# Patient Record
Sex: MALE | Race: WHITE
[De-identification: names, ages, dates, MRNs, and addresses within clinical notes are randomized per-mention and may not be internally consistent; named-entity substitution may affect disease eponyms.]

---

## 2018-08-09 ENCOUNTER — HOSPITAL ENCOUNTER (EMERGENCY)
Dept: HOSPITAL 97 - ER | Age: 48
Discharge: HOME | End: 2018-08-09
Payer: COMMERCIAL

## 2018-08-09 DIAGNOSIS — R04.0: Primary | ICD-10-CM

## 2018-08-09 PROCEDURE — 99281 EMR DPT VST MAYX REQ PHY/QHP: CPT

## 2018-08-09 NOTE — ER
Nurse's Notes                                                                                     

 [facility]                                                                

Name: Bill Rosa                                                                          

Age: 48 yrs                                                                                       

Sex: Male                                                                                         

: 1970                                                                                   

MRN: [id number]                                                                                   

Arrival Date: 2018                                                                          

Time: 07:32                                                                                       

Account#: [id number]                                                                            

Bed 20                                                                                            

Private MD:                                                                                       

Diagnosis: Epistaxis                                                                              

                                                                                                  

Presentation:                                                                                     

                                                                                             

07:37 Presenting complaint: Patient states: I have been having intermittent nose bleeding     la1 

      since Monday, no bleeding so far today. Transition of care: patient was not received        

      from another setting of care. Onset of symptoms was 2018. Risk Assessment:       

      Do you want to hurt yourself or someone else? Patient reports no desire to harm self or     

      others. Initial Sepsis Screen: Does the patient meet any 2 criteria? No. Patient's          

      initial sepsis screen is negative. Does the patient have a suspected source of              

      infection? No. Patient's initial sepsis screen is negative. Care prior to arrival: None.    

07:37 Method Of Arrival: Ambulatory                                                           la1 

07:37 Acuity: WILLIE 4                                                                           la1 

                                                                                                  

Triage Assessment:                                                                                

07:49 General: Appears in no apparent distress. uncomfortable, Behavior is calm, cooperative, hj  

      appropriate for age. Pain: Denies pain. EENT: Reports nasal discharge that is bloody.       

      Neuro: Cardiovascular: Capillary refill < 3 seconds Patient's skin is warm and dry.         

      Respiratory: Airway is patent Respiratory effort is even, unlabored, Respiratory            

      pattern is regular, symmetrical. GI: No signs and/or symptoms were reported involving       

      the gastrointestinal system. : No signs and/or symptoms were reported regarding the       

      genitourinary system. Derm: No signs and/or symptoms reported regarding the                 

      dermatologic system. Musculoskeletal: No signs and/or symptoms reported regarding the       

      musculoskeletal system.                                                                     

                                                                                                  

Historical:                                                                                       

- Allergies:                                                                                      

07:37 No Known Allergies;                                                                     la1 

- PMHx:                                                                                           

07:37 None;                                                                                   la1 

                                                                                                  

- Immunization history:: Adult Immunizations up to date.                                          

- Social history:: Smoking status: Patient/guardian denies using tobacco.                         

- Ebola Screening: : No symptoms or risks identified at this time.                                

- Family history:: not pertinent.                                                                 

- Hospitalizations: : No recent hospitalization is reported.                                      

                                                                                                  

                                                                                                  

Screenin:48 Abuse screen: Denies threats or abuse. Denies injuries from another. Nutritional        hj  

      screening: No deficits noted. Tuberculosis screening: No symptoms or risk factors           

      identified. Fall Risk None identified.                                                      

                                                                                                  

Assessment:                                                                                       

07:50 Reassessment: see triage for assessment;.                                               hj  

                                                                                                  

Vital Signs:                                                                                      

07:38 Pulse 97; Resp 16; Temp 97.6; Pulse Ox 100% on R/A; Weight 104.33 kg; Height 5 ft. 11   la1 

      in. (180.34 cm);                                                                            

07:39  / 100;                                                                           la1 

07:38 Body Mass Index 32.08 (104.33 kg, 180.34 cm)                                            la1 

                                                                                                  

ED Course:                                                                                        

07:32 Patient arrived in ED.                                                                  rg4 

07:37 Triage completed.                                                                       la1 

07:38 Arm band placed on left wrist.                                                          la1 

07:40 Raymond Powell MD is Attending Physician.                                                rn  

07:48 Diego Giraldo, RN is Primary Nurse.                                                    hj  

07:49 Patient has correct armband on for positive identification. Bed in low position. Call   hj  

      light in reach. Side rails up X 1.                                                          

07:53 No provider procedures requiring assistance completed. Patient did not have IV access   hj  

      during this emergency room visit.                                                           

                                                                                                  

Administered Medications:                                                                         

No medications were administered                                                                  

                                                                                                  

                                                                                                  

Outcome:                                                                                          

07:53 Discharged to home ambulatory.                                                          hj  

07:53 Condition: stable                                                                           

07:53 Discharge instructions given to patient, Instructed on discharge instructions, follow       

      up and referral plans. Demonstrated understanding of instructions, follow-up care,          

      Following a medical screening exam, the patient was provided information regarding          

      alternative care sites and resources available per registration personnel.                  

07:57 Discharge ordered by MD.                                                                rn  

08:04 Patient left the ED.                                                                    cc3 

                                                                                                  

Signatures:                                                                                       

Raymond Powell MD MD rn Attema, Lee, RN RN   laDiego Fisher, Dinora Brito RN                                 rg4                                                  

Maricruz De Los Santos                             cc3                                                  

                                                                                                  

**************************************************************************************************

## 2018-08-09 NOTE — EDPHYS
Physician Documentation                                                                           

 [facility]                                                                

Name: Bill Rosa                                                                          

Age: 48 yrs                                                                                       

Sex: Male                                                                                         

: 1970                                                                                   

MRN: [id number]                                                                                   

Arrival Date: 2018                                                                          

Time: 07:32                                                                                       

Account#: [id number]                                                                            

Bed 20                                                                                            

Private MD:                                                                                       

ED Physician Raymond Powell                                                                         

HPI:                                                                                              

                                                                                             

07:53 This 48 yrs old  Male presents to ER via Ambulatory with complaints of Nose    rn  

      Bleed.                                                                                      

07:53 The patient presents with a nose bleed. Onset: The symptoms/episode began/occurred 1    rn  

      week(s) ago. Associated signs and symptoms: Loss of consciousness: the patient              

      experienced no loss of consciousness, Pertinent positives: bleeding, Pertinent              

      negatives: fever, lightheadedness, shortness of breath. Severity of symptoms: At their      

      worst the symptoms were mild in the emergency department the symptoms have resolved.        

      The patient has not experienced similar symptoms in the past. Reports nosebleed             

      intermittent for 1 week, no trauma, seen at [facility] recently for this, resolved, hasn't       

      bled since last night, not on blood thinners. .                                             

                                                                                                  

Historical:                                                                                       

- Allergies:                                                                                      

07:37 No Known Allergies;                                                                     la1 

- PMHx:                                                                                           

07:37 None;                                                                                   la1 

                                                                                                  

- Immunization history:: Adult Immunizations up to date.                                          

- Social history:: Smoking status: Patient/guardian denies using tobacco.                         

- Ebola Screening: : No symptoms or risks identified at this time.                                

- Family history:: not pertinent.                                                                 

- Hospitalizations: : No recent hospitalization is reported.                                      

                                                                                                  

                                                                                                  

ROS:                                                                                              

07:53 Constitutional: Negative for fever, chills, and weight loss, Eyes: Negative for injury, rn  

      pain, redness, and discharge, ENT: + nosebleed Respiratory: -- sob Skin: Negative for       

      injury, rash, and discoloration, Neuro: neg for syncope                                     

                                                                                                  

Exam:                                                                                             

07:53 Constitutional:  This is a well developed, well nourished patient who is awake, alert,  rn  

      and in no acute distress. Head/Face:  Normocephalic, atraumatic. ENT:  Right nare with      

      dry blood, no active bleeding                                                               

                                                                                                  

Vital Signs:                                                                                      

07:38 Pulse 97; Resp 16; Temp 97.6; Pulse Ox 100% on R/A; Weight 104.33 kg; Height 5 ft. 11   la1 

      in. (180.34 cm);                                                                            

07:39  / 100;                                                                           la1 

07:38 Body Mass Index 32.08 (104.33 kg, 180.34 cm)                                            la1 

                                                                                                  

MDM:                                                                                              

07:40 Patient medically screened.                                                             rn  

07:53 Differential diagnosis: spontaneous epistaxis. Data reviewed: vital signs, nurses       rn  

      notes, and as a result, I will discharge patient. Counseling: I had a detailed              

      discussion with the patient and/or guardian regarding: the historical points, exam          

      findings, and any diagnostic results supporting the discharge/admit diagnosis, the need     

      for outpatient follow up, to return to the emergency department if symptoms worsen or       

      persist or if there are any questions or concerns that arise at home. Special               

      discussion: I discussed with the patient/guardian in detail that at this point there is     

      no indication for admission to the hospital. It is understood, however, that if the         

      symptoms persist or worsen the patient needs to return immediately for re-evaluation.       

      Based on the history and exam findings, there is no indication for further emergent         

      testing or inpatient evaluation. I discussed with the patient/guardian the need to see      

      the ENT specialist for further evaluation of the symptoms.                                  

07:53 ED course: Given nasal clamps and instructed how to manage nosebleeds at home. .        rn  

                                                                                                  

Administered Medications:                                                                         

No medications were administered                                                                  

                                                                                                  

                                                                                                  

Disposition:                                                                                      

18 07:57 Discharged to Home as Medical Screen. Impression: Epistaxis.                       

- Condition is Stable.                                                                            

- Discharge Instructions: Nosebleed, Adult.                                                       

                                                                                                  

- Medication Reconciliation Form, Thank You Letter, Antibiotic Education, Prescription            

  Opioid Use, Work release form form.                                                             

- Follow up: Private Physician; When: As needed; Reason: Recheck today's complaints,              

  Re-evaluation by your physician.                                                                

- Problem is an ongoing problem.                                                                  

- Symptoms have improved.                                                                         

                                                                                                  

                                                                                                  

                                                                                                  

Signatures:                                                                                       

Raymond Powell MD MD rn Attema, Lee, RN RN   la1                                                  

Maricruz De Los Santos                             cc3                                                  

                                                                                                  

Corrections: (The following items were deleted from the chart)                                    

08:04 07:57 2018 07:57 Discharged to Home as Medical Screen. Impression: Epistaxis.     cc3 

      Condition is Stable. Forms are Work release form, Medication Reconciliation Form, Thank     

      You Letter, Antibiotic Education, Prescription Opioid Use. Follow up: Private               

      Physician; When: As needed; Reason: Recheck today's complaints, Re-evaluation by your       

      physician. Problem is an ongoing problem. Symptoms have improved. rn                        

                                                                                                  

**************************************************************************************************